# Patient Record
Sex: FEMALE | Race: WHITE
[De-identification: names, ages, dates, MRNs, and addresses within clinical notes are randomized per-mention and may not be internally consistent; named-entity substitution may affect disease eponyms.]

---

## 2018-03-16 ENCOUNTER — HOSPITAL ENCOUNTER (EMERGENCY)
Dept: HOSPITAL 91 - FTE | Age: 23
Discharge: HOME | End: 2018-03-16
Payer: COMMERCIAL

## 2018-03-16 ENCOUNTER — HOSPITAL ENCOUNTER (EMERGENCY)
Age: 23
Discharge: HOME | End: 2018-03-16

## 2018-03-16 DIAGNOSIS — R51: Primary | ICD-10-CM

## 2018-03-16 PROCEDURE — 99283 EMERGENCY DEPT VISIT LOW MDM: CPT

## 2018-03-16 RX ADMIN — ACETAMINOPHEN 1 MG: 325 TABLET, FILM COATED ORAL at 08:11

## 2018-04-17 ENCOUNTER — HOSPITAL ENCOUNTER (EMERGENCY)
Age: 23
Discharge: HOME | End: 2018-04-17

## 2018-04-17 ENCOUNTER — HOSPITAL ENCOUNTER (EMERGENCY)
Dept: HOSPITAL 91 - FTE | Age: 23
Discharge: HOME | End: 2018-04-17
Payer: COMMERCIAL

## 2018-04-17 DIAGNOSIS — H61.23: ICD-10-CM

## 2018-04-17 DIAGNOSIS — H92.03: Primary | ICD-10-CM

## 2018-04-17 PROCEDURE — 69209 REMOVE IMPACTED EAR WAX UNI: CPT

## 2018-04-17 PROCEDURE — 99283 EMERGENCY DEPT VISIT LOW MDM: CPT

## 2019-04-16 ENCOUNTER — HOSPITAL ENCOUNTER (EMERGENCY)
Dept: HOSPITAL 91 - FTE | Age: 24
Discharge: HOME | End: 2019-04-16
Payer: COMMERCIAL

## 2019-04-16 ENCOUNTER — HOSPITAL ENCOUNTER (EMERGENCY)
Dept: HOSPITAL 10 - FTE | Age: 24
Discharge: HOME | End: 2019-04-16
Payer: COMMERCIAL

## 2019-04-16 VITALS — HEART RATE: 99 BPM | DIASTOLIC BLOOD PRESSURE: 94 MMHG | RESPIRATION RATE: 18 BRPM | SYSTOLIC BLOOD PRESSURE: 145 MMHG

## 2019-04-16 VITALS
BODY MASS INDEX: 42.03 KG/M2 | HEIGHT: 63 IN | WEIGHT: 237.22 LBS | HEIGHT: 63 IN | WEIGHT: 237.22 LBS | BODY MASS INDEX: 42.03 KG/M2

## 2019-04-16 DIAGNOSIS — J02.9: Primary | ICD-10-CM

## 2019-04-16 PROCEDURE — 99282 EMERGENCY DEPT VISIT SF MDM: CPT

## 2019-04-16 NOTE — ERD
ER Documentation


Chief Complaint


Chief Complaint





Feels like there is "fluid in her throat" but not swelling/pain x 1 week





HPI


23-year-old female presenting with irritation in her throat.  She states that 


she occasionally has a burp that comes up from her belly and alleviates her 


irritation.  Patient states this has been going on for about a week.  She is not


taken medications for symptoms.  Denies any recent runny nose, shortness of 


breath or abdominal pain.  Has no vomiting but feels nauseous.  No fevers.  


Medical history of seizures.  NKDA.  Surgical history tonsillectomy 15 years 


ago.  Social history denies





ROS


All systems reviewed and are negative except as per history of present illness.





Medications


Home Meds


Active Scripts


Acetaminophen* (Tylenol*) 325 Mg Tablet, 2 TAB PO Q6 PRN for PAIN AND OR 


ELEVATED TEMP, #20 TAB


   Prov:ISRAEL LAWSON PA-C         4/16/19


Famotidine* (Pepcid*) 20 Mg Tablet, 20 MG PO BID for 4 Days, #30 TAB


   Prov:ISRAEL LAWSON PA-C         4/16/19


Ibuprofen* (Motrin*) 600 Mg Tab, 600 MG PO Q6, #30 TAB


   Prov:XUAN MALONE PA-C         4/17/18


Amoxicillin* (Amoxicillin*) 500 Mg Cap, 500 MG PO TID for 7 Days, CAP


   Prov:XUAN MALONE PA-C         4/17/18


Acetamin/Butalbital/Caffeine* (Fioricet*) [id number] Tab, 1 TAB PO Q6H PRN 


for PAIN, #30 TAB


   Prov:ISRAEL LAWSON PA-C         3/16/18


Hydrocortisone* Topical (Hydrocortisone* Topical) 1%-28.35 Gm Cream..g., 1 


APPLIC TOP BID, #1 TUB 2 Refills


   Prov:TEJ BOATENG PA-C         5/8/16


Diphenhydramine Hcl* (Benadryl*) 25 Mg Cap, 25 MG PO Q6, #30 CAP 0 Refills


   Prov:TEJ BOATENG PA-C         5/8/16


Ibuprofen* (Motrin*) 400 Mg Tab, 400 MG PO Q8, #30 TAB 0 Refills


   Prov:TEJ BOATENG PA-C         5/8/16





Allergies


Allergies:  


Coded Allergies:  


     No Known Drug Allergies (Verified  Allergy, Unknown, 5/8/16)





PMhx/Soc


History of Surgery:  Yes (Tonsillectomy)


Anesthesia Reaction:  No


Hx Neurological Disorder:  No


Hx Respiratory Disorders:  No


Hx Cardiac Disorders:  No


Hx Psychiatric Problems:  No


Hx Miscellaneous Medical Probl:  Yes (Gall stones)


Hx Alcohol Use:  No


Hx Substance Use:  No


Hx Tobacco Use:  No





FmHx


Family History:  No diabetes, No coronary disease, No other





Physical Exam


Vitals





Vital Signs


  Date      Temp  Pulse  Resp  B/P (MAP)   Pulse Ox  O2          O2 Flow    FiO2


Time                                                 Delivery    Rate


   4/16/19  98.0     99    18      145/94        98


     14:17                          (111)





Physical Exam


GENERAL: The patient is well-appearing, well-nourished, in no acute distress


HEENT: Atraumatic.  Conjunctivae are pink.  Pupils equal, round, and reactive to


light.  There is no scleral icterus.  Tympanic membranes clear bilaterally.  


Oropharynx clear.  


NECK: C-spine is soft and supple.  There is no meningismus.  There is no 


cervical lymphadenopathy.  


CHEST: Clear to auscultation bilaterally.  There are no rales, wheezes or 


rhonchi.


HEART: Regular rate and rhythm.  No murmurs, clicks, rubs or gallops.


ABDOMEN:Soft, nontender and nondistended.  Good bowel sounds.  No rebound or 


guarding.  No gross peritonitis.  No gross organomegaly or masses.





Procedures/MDM


MDM: 23-year-old female presenting with throat irritation.  I believe patient 


likely has acid reflux.  I have low suspicion for bacterial throat infection.  I


have low suspicion for pneumonia.  I have low suspicion for acute abdominal 


emergency.  I have low suspicion for bacterial infectious etiology.  Patient is 


discharged with supportive medications and recommended to return to the ER 


symptoms change or worsen.  All questions answered at discharge





Departure


Diagnosis:  


   Primary Impression:  


   Sore throat


Condition:  Stable


Patient Instructions:  Tips to Control Acid Reflux


Referrals:  


COMM[facility]ITY [facility]ICS


YOU HAVE RECEIVED A MEDICAL SCREENING EXAM AND THE RESULTS INDICATE THAT YOU DO 


NOT HAVE A CONDITION THAT REQUIRES URGENT TREATMENT IN THE EMERGENCY DEPARTMENT.





FURTHER EVALUATION AND TREATMENT OF YOUR CONDITION CAN WAIT UNTIL YOU ARE SEEN 


IN YOUR DOCTORS OFFICE WITHIN THE NEXT 1-2 DAYS. IT IS YOUR RESPONSIBILITY TO 


MAKE AN APPOINTMENT FOR FOLOW-UP CARE.





IF YOU HAVE A PRIMARY DOCTOR


--you should call your primary doctor and schedule an appointment





IF YOU DO NOT HAVE A PRIMARY DOCTOR YOU CAN CALL OUR PHYSICIAN REFERRAL HOTLINE 


AT


 [phone] 





IF YOU CAN NOT AFFORD TO SEE A PHYSICIAN YOU CAN CHOSE FROM THE FOLLOWING [facility] [phone] [address] [facility] [phone] [address] [facility] [phone] [address] [facility] [phone] [address] [facility] [phone] [address] [facility]. [facility] [phone]


[address]





Additional Instructions:  


FOLLOW UP WITH YOUR PRIMARY CARE PHYSICIAN TOMORROW.Return to this facility if 


you are not improving as expected.











ISRAEL LAWSON PA-C       Apr 16, 2019 14:44

## 2019-04-23 ENCOUNTER — HOSPITAL ENCOUNTER (EMERGENCY)
Dept: HOSPITAL 10 - FTE | Age: 24
LOS: 1 days | Discharge: HOME | End: 2019-04-24
Payer: COMMERCIAL

## 2019-04-23 ENCOUNTER — HOSPITAL ENCOUNTER (EMERGENCY)
Dept: HOSPITAL 91 - FTE | Age: 24
LOS: 1 days | Discharge: HOME | End: 2019-04-24
Payer: COMMERCIAL

## 2019-04-23 VITALS
BODY MASS INDEX: 42.15 KG/M2 | BODY MASS INDEX: 42.15 KG/M2 | HEIGHT: 63 IN | HEIGHT: 63 IN | WEIGHT: 237.88 LBS | WEIGHT: 237.88 LBS

## 2019-04-23 VITALS — SYSTOLIC BLOOD PRESSURE: 149 MMHG | DIASTOLIC BLOOD PRESSURE: 93 MMHG | RESPIRATION RATE: 18 BRPM | HEART RATE: 76 BPM

## 2019-04-23 DIAGNOSIS — R05: Primary | ICD-10-CM

## 2019-04-23 DIAGNOSIS — R06.02: ICD-10-CM

## 2019-04-23 PROCEDURE — 93005 ELECTROCARDIOGRAM TRACING: CPT

## 2019-04-23 PROCEDURE — 99283 EMERGENCY DEPT VISIT LOW MDM: CPT

## 2019-04-24 NOTE — ERD
ER Documentation


Chief Complaint


Chief Complaint





non prod cough x2wk, sob on exertion. denies asthma. no NVD, no temp





HPI


This is a 23-year-old female patient who presents to the emergency room with 


complaint of feeling like her throat is closing and cough after she runs up the 


stairs at her house.  States this only happens to her at night when she gets 


home from being out.  The only time she runs up any stairs or exerts herself is 


when she is at home.  Denies fevers, denies dysphasia, unproductive cough, no 


wheezing, no chest pain.  No recent travel,  no hormone therapy, no leg pain.





ROS


All systems reviewed and are negative except as per history of present illness.





Medications


Home Meds


Active Scripts


Omeprazole* (Omeprazole*) 40 Mg Capsule.dr, 40 MG PO DAILY, #10 CAP


   Prov:GONZALES ROEW NP         4/24/19


Albuterol Sulfate* (Proair HFA*) 8.5 Gm Hfa.aer.ad, 2 PUFF INH Q4 for wheezing, 


#1 INHALER


   Prov:GONZALES ROWE NP         4/24/19


Acetaminophen* (Tylenol*) 325 Mg Tablet, 2 TAB PO Q6 PRN for PAIN AND OR 


ELEVATED TEMP, #20 TAB


   Prov:ISRAEL LAWSON PA-C         4/16/19


Famotidine* (Pepcid*) 20 Mg Tablet, 20 MG PO BID for 4 Days, #30 TAB


   Prov:ISRAEL LAWSON PA-C         4/16/19


Ibuprofen* (Motrin*) 600 Mg Tab, 600 MG PO Q6, #30 TAB


   Prov:XUAN MALONE PA-C         4/17/18


Amoxicillin* (Amoxicillin*) 500 Mg Cap, 500 MG PO TID for 7 Days, CAP


   Prov:XUAN MALONE PA-C         4/17/18


Acetamin/Butalbital/Caffeine* (Fioricet*) [id number] Tab, 1 TAB PO Q6H PRN 


for PAIN, #30 TAB


   Prov:ISRAEL LAWSON PA-C         3/16/18


Hydrocortisone* Topical (Hydrocortisone* Topical) 1%-28.35 Gm Cream..g., 1 


APPLIC TOP BID, #1 TUB 2 Refills


   Prov:TEJ BOATENG PA-C         5/8/16


Diphenhydramine Hcl* (Benadryl*) 25 Mg Cap, 25 MG PO Q6, #30 CAP 0 Refills


   Prov:TEJ BOATENG PARonniGUDELIA         5/8/16


Ibuprofen* (Motrin*) 400 Mg Tab, 400 MG PO Q8, #30 TAB 0 Refills


   Prov:TEJ BOATENGGUDELIA         5/8/16





Allergies


Allergies:  


Coded Allergies:  


     No Known Drug Allergies (Verified  Allergy, Unknown, 5/8/16)





PMhx/Soc


History of Surgery:  Yes (Tonsillectomy)


Anesthesia Reaction:  No


Hx Neurological Disorder:  No


Hx Respiratory Disorders:  No


Hx Cardiac Disorders:  No


Hx Psychiatric Problems:  No


Hx Miscellaneous Medical Probl:  Yes (Gall stones)


Hx Alcohol Use:  No


Hx Substance Use:  No


Hx Tobacco Use:  No


Smoking Status:  Never smoker





FmHx


Family History:  No diabetes, No coronary disease, No other





Physical Exam


Vitals





Vital Signs


  Date      Temp  Pulse  Resp  B/P (MAP)   Pulse Ox  O2          O2 Flow    FiO2


Time                                                 Delivery    Rate


   4/23/19  97.0     76    18      149/93       100


     22:43                          (111)





Physical Exam


Const:   No acute distress


Head:   Atraumatic 


Eyes:    Normal Conjunctiva, PERRL


ENT:    Normal External Ears, Nose and Mouth.


Neck:               Full range of motion. No meningismus. No lymphadenopathy or 


thyromegaly, no drooling


Resp:   Clear to auscultation bilaterally, no wheezing, no rales, no stridor


Cardio:   Regular rate and rhythm, no murmurs


Abd:    Soft, non tender, non distended. Normal bowel sounds


Skin:   No petechiae or rashes


Back:   No midline or flank tenderness


Ext:    No cyanosis, or edema


Neur:   Awake and alert


Psych:    Normal Mood and Affect





Procedures/MDM


This is a 23-year-old female patient who presents emergency room with complaint 


of feeling of shortness of breath when she runs up her stairs at her house.





ED COURSE:


The patient was stable throughout ED course. 





EKG:


Read by , attending physician.


EKG shows normal sinus rhythm at a rate of 65 bpm.


No arrhythmias, acute ST elevations or T wave changes were noted.


 





MEDICATIONS GIVEN: 


None. 


 





MDM:


Discussion had with mother and child regarding potential causes of cough and 


exertional shortness of breath.  Cardiac causes including ischemic heart disease


do not appear to be a etiology today due to negative EKG and negative risk 


factors CA 0.  Patient provided with copy of EKG for follow-up with her 


primary care doctor.  Exercise-induced asthma is another potential cause of her 


having cough and shortness of breath after exertion.  Albuterol inhaler was 


prescribed with instructions on using prior to exertion.  As the symptoms happen


in the evening time GERD may be another possibility for evening cough.  PPI 


prescription provided.  There does not seem to be any indication for foreign 


body aspiration, thyromegaly, or other indication for impending airway 


obstruction.





 


The patient is clinically well appearing and stable.  Symptoms are not 


suggestive of cardiac ischemia, pulmonary embolus, aortic dissection, or other 


serious etiology.  These diagnoses have been considered and excluded clinically 


and with additional diagnostic studies as indicated.  Nonetheless, it is 


understood by both the patient and provider that no clinical or diagnostic 


assessment can entirely exclude such diseases.  Chest pain precautions have been


given and the patient has been advised to return for worsening symptoms or any 


concerns.








DISPOSITION: The patient has been discharge home to follow-up with community 


physician.





Departure


Diagnosis:  


   Primary Impression:  


   Cough


Condition:  Stable


Patient Instructions:  Cough, Chronic, Uncertain Cause (Child)


Referrals:  


[facility]





Additional Instructions:  


Thank you very much for allowing us to participate in your care. 


Your health and safety is our top priority at [facility].





Call your primary care doctor TOMORROW for an appointment during the next 2-4 


days and bring all the information and medications prescribed. 





Have prescriptions filled and follow precisely the directions on the label. 





If the symptoms get worse and your provider is unavailable, return to the 


Emergency Department immediately.








Take albuterol inhaler 2 puffs with any wheezing or shortness of breath and 15 


minutes before exercise


Use omeprazole daily





Follow-up with your doctor in 1-2 days for reevaluation.  Bring EKG to doctor's 


appointment.











GONZALES ROWE NP              Apr 24, 2019 01:07